# Patient Record
Sex: FEMALE | URBAN - METROPOLITAN AREA
[De-identification: names, ages, dates, MRNs, and addresses within clinical notes are randomized per-mention and may not be internally consistent; named-entity substitution may affect disease eponyms.]

---

## 2020-09-03 ENCOUNTER — NEW REFERRAL (OUTPATIENT)
Dept: URBAN - METROPOLITAN AREA CLINIC 87 | Facility: CLINIC | Age: 65
End: 2020-09-03

## 2020-09-03 DIAGNOSIS — H25.093: ICD-10-CM

## 2020-09-03 DIAGNOSIS — H43.821: ICD-10-CM

## 2020-09-03 DIAGNOSIS — H35.3132: ICD-10-CM

## 2020-09-03 DIAGNOSIS — H35.341: ICD-10-CM

## 2020-09-03 PROCEDURE — 92134 CPTRZ OPH DX IMG PST SGM RTA: CPT

## 2020-09-03 PROCEDURE — 99244 OFF/OP CNSLTJ NEW/EST MOD 40: CPT

## 2020-09-03 PROCEDURE — 92202 OPSCPY EXTND ON/MAC DRAW: CPT

## 2020-09-03 ASSESSMENT — VISUAL ACUITY
OD_SC: 20/400
OD_SC: 20/400
OS_SC: 20/25-1
OS_SC: 20/50

## 2020-09-03 ASSESSMENT — TONOMETRY
OD_IOP_MMHG: 15
OS_IOP_MMHG: 13

## 2020-09-21 ENCOUNTER — SURGERY/PROCEDURE (OUTPATIENT)
Age: 65
End: 2020-09-21

## 2020-09-22 ENCOUNTER — 1 DAY POST-OP (OUTPATIENT)
Dept: URBAN - METROPOLITAN AREA CLINIC 87 | Facility: CLINIC | Age: 65
End: 2020-09-22

## 2020-09-22 DIAGNOSIS — Z98.890: ICD-10-CM

## 2020-09-22 PROCEDURE — 99024 POSTOP FOLLOW-UP VISIT: CPT

## 2020-09-22 ASSESSMENT — TONOMETRY
OD_IOP_MMHG: 13
OS_IOP_MMHG: 12

## 2020-09-22 ASSESSMENT — VISUAL ACUITY: OS_SC: 20/25-1

## 2020-09-29 ENCOUNTER — POST-OP CHECK (OUTPATIENT)
Dept: URBAN - METROPOLITAN AREA CLINIC 87 | Facility: CLINIC | Age: 65
End: 2020-09-29

## 2020-09-29 DIAGNOSIS — H35.341: ICD-10-CM

## 2020-09-29 DIAGNOSIS — H35.3132: ICD-10-CM

## 2020-09-29 DIAGNOSIS — H25.093: ICD-10-CM

## 2020-09-29 PROCEDURE — 92202 OPSCPY EXTND ON/MAC DRAW: CPT

## 2020-09-29 PROCEDURE — 99024 POSTOP FOLLOW-UP VISIT: CPT

## 2020-09-29 ASSESSMENT — VISUAL ACUITY: OS_SC: 20/20-2

## 2020-09-29 ASSESSMENT — TONOMETRY
OS_IOP_MMHG: 9
OD_IOP_MMHG: 9

## 2020-10-19 ENCOUNTER — POST-OP CHECK (OUTPATIENT)
Dept: URBAN - METROPOLITAN AREA CLINIC 87 | Facility: CLINIC | Age: 65
End: 2020-10-19

## 2020-10-19 DIAGNOSIS — H35.341: ICD-10-CM

## 2020-10-19 PROCEDURE — 99024 POSTOP FOLLOW-UP VISIT: CPT

## 2020-10-19 PROCEDURE — 92134 CPTRZ OPH DX IMG PST SGM RTA: CPT

## 2020-10-19 ASSESSMENT — TONOMETRY
OD_IOP_MMHG: 17
OS_IOP_MMHG: 14

## 2020-10-19 ASSESSMENT — VISUAL ACUITY
OD_SC: 20/150
OD_PH: 20/80-2
OS_SC: 20/20-2

## 2020-11-30 ENCOUNTER — POST-OP CHECK (OUTPATIENT)
Dept: URBAN - METROPOLITAN AREA CLINIC 87 | Facility: CLINIC | Age: 65
End: 2020-11-30

## 2020-11-30 VITALS — HEIGHT: 55 IN

## 2020-11-30 DIAGNOSIS — H35.341: ICD-10-CM

## 2020-11-30 PROCEDURE — 99024 POSTOP FOLLOW-UP VISIT: CPT

## 2020-11-30 PROCEDURE — 92134 CPTRZ OPH DX IMG PST SGM RTA: CPT

## 2020-11-30 ASSESSMENT — VISUAL ACUITY
OS_SC: 20/25
OD_PH: 20/150+1
OD_SC: 20/200+2

## 2020-11-30 ASSESSMENT — TONOMETRY
OS_IOP_MMHG: 11
OD_IOP_MMHG: 12

## 2021-02-22 ENCOUNTER — FOLLOW UP (OUTPATIENT)
Dept: URBAN - METROPOLITAN AREA CLINIC 87 | Facility: CLINIC | Age: 66
End: 2021-02-22

## 2021-02-22 DIAGNOSIS — H25.093: ICD-10-CM

## 2021-02-22 DIAGNOSIS — H35.341: ICD-10-CM

## 2021-02-22 DIAGNOSIS — E11.9: ICD-10-CM

## 2021-02-22 DIAGNOSIS — H35.3132: ICD-10-CM

## 2021-02-22 PROCEDURE — 92202 OPSCPY EXTND ON/MAC DRAW: CPT

## 2021-02-22 PROCEDURE — 92134 CPTRZ OPH DX IMG PST SGM RTA: CPT

## 2021-02-22 PROCEDURE — 99214 OFFICE O/P EST MOD 30 MIN: CPT

## 2021-02-22 ASSESSMENT — TONOMETRY
OD_IOP_MMHG: 12
OS_IOP_MMHG: 12

## 2021-02-22 ASSESSMENT — VISUAL ACUITY
OD_SC: 20/150-1
OS_SC: 20/30-1

## 2021-07-26 ENCOUNTER — FOLLOW UP (OUTPATIENT)
Dept: URBAN - METROPOLITAN AREA CLINIC 87 | Facility: CLINIC | Age: 66
End: 2021-07-26

## 2021-07-26 DIAGNOSIS — H35.3132: ICD-10-CM

## 2021-07-26 DIAGNOSIS — E11.9: ICD-10-CM

## 2021-07-26 DIAGNOSIS — H35.341: ICD-10-CM

## 2021-07-26 DIAGNOSIS — H25.092: ICD-10-CM

## 2021-07-26 DIAGNOSIS — Z96.1: ICD-10-CM

## 2021-07-26 DIAGNOSIS — H35.373: ICD-10-CM

## 2021-07-26 DIAGNOSIS — H26.491: ICD-10-CM

## 2021-07-26 PROCEDURE — 92202 OPSCPY EXTND ON/MAC DRAW: CPT

## 2021-07-26 PROCEDURE — 92134 CPTRZ OPH DX IMG PST SGM RTA: CPT

## 2021-07-26 PROCEDURE — 99214 OFFICE O/P EST MOD 30 MIN: CPT

## 2021-07-26 ASSESSMENT — TONOMETRY
OD_IOP_MMHG: 8
OS_IOP_MMHG: 9

## 2021-07-26 ASSESSMENT — VISUAL ACUITY
OD_SC: 20/70+2
OS_SC: 20/20-1

## 2022-05-27 ENCOUNTER — FOLLOW UP (OUTPATIENT)
Dept: URBAN - METROPOLITAN AREA CLINIC 87 | Facility: CLINIC | Age: 67
End: 2022-05-27

## 2022-05-27 DIAGNOSIS — H35.3132: ICD-10-CM

## 2022-05-27 DIAGNOSIS — Z96.1: ICD-10-CM

## 2022-05-27 DIAGNOSIS — E11.9: ICD-10-CM

## 2022-05-27 DIAGNOSIS — H35.372: ICD-10-CM

## 2022-05-27 DIAGNOSIS — H25.092: ICD-10-CM

## 2022-05-27 DIAGNOSIS — H26.491: ICD-10-CM

## 2022-05-27 DIAGNOSIS — H35.341: ICD-10-CM

## 2022-05-27 PROCEDURE — 92202 OPSCPY EXTND ON/MAC DRAW: CPT

## 2022-05-27 PROCEDURE — 92134 CPTRZ OPH DX IMG PST SGM RTA: CPT

## 2022-05-27 PROCEDURE — 92014 COMPRE OPH EXAM EST PT 1/>: CPT

## 2022-05-27 ASSESSMENT — VISUAL ACUITY
OD_SC: 20/70+2
OS_SC: 20/30-2

## 2022-05-27 ASSESSMENT — TONOMETRY
OD_IOP_MMHG: 10
OS_IOP_MMHG: 11

## 2024-02-10 ENCOUNTER — HOSPITAL ENCOUNTER (EMERGENCY)
Facility: HOSPITAL | Age: 69
Discharge: HOME | End: 2024-02-10
Attending: EMERGENCY MEDICINE | Admitting: EMERGENCY MEDICINE
Payer: COMMERCIAL

## 2024-02-10 ENCOUNTER — APPOINTMENT (EMERGENCY)
Dept: RADIOLOGY | Facility: HOSPITAL | Age: 69
End: 2024-02-10
Payer: COMMERCIAL

## 2024-02-10 VITALS
HEART RATE: 93 BPM | RESPIRATION RATE: 20 BRPM | HEIGHT: 62 IN | DIASTOLIC BLOOD PRESSURE: 71 MMHG | OXYGEN SATURATION: 97 % | SYSTOLIC BLOOD PRESSURE: 125 MMHG | TEMPERATURE: 98.6 F

## 2024-02-10 DIAGNOSIS — J45.901 ACUTE ASTHMA EXACERBATION: Primary | ICD-10-CM

## 2024-02-10 LAB
ATRIAL RATE: 78 BPM
FLUAV RNA RESP QL NAA+PROBE: NEGATIVE
FLUBV RNA RESP QL NAA+PROBE: NEGATIVE
P AXIS: 14 DEGREES
PR INTERVAL: 150 MS
QRS AXIS: 76 DEGREES
QRSD INTERVAL: 88 MS
QT INTERVAL: 386 MS
QTC INTERVAL: 440 MS
RSV RNA RESP QL NAA+PROBE: NEGATIVE
SARS-COV-2 RNA RESP QL NAA+PROBE: POSITIVE
T WAVE AXIS: 54 DEGREES
VENTRICULAR RATE: 78 BPM

## 2024-02-10 PROCEDURE — 99285 EMERGENCY DEPT VISIT HI MDM: CPT | Performed by: EMERGENCY MEDICINE

## 2024-02-10 PROCEDURE — 71045 X-RAY EXAM CHEST 1 VIEW: CPT

## 2024-02-10 PROCEDURE — 99283 EMERGENCY DEPT VISIT LOW MDM: CPT

## 2024-02-10 PROCEDURE — 0241U HB NFCT DS VIR RESP RNA 4 TRGT: CPT | Performed by: EMERGENCY MEDICINE

## 2024-02-10 PROCEDURE — 93005 ELECTROCARDIOGRAM TRACING: CPT

## 2024-02-10 PROCEDURE — 94640 AIRWAY INHALATION TREATMENT: CPT

## 2024-02-10 RX ORDER — IPRATROPIUM BROMIDE AND ALBUTEROL SULFATE 2.5; .5 MG/3ML; MG/3ML
3 SOLUTION RESPIRATORY (INHALATION) ONCE
Status: COMPLETED | OUTPATIENT
Start: 2024-02-10 | End: 2024-02-10

## 2024-02-10 RX ORDER — PREDNISONE 50 MG/1
50 TABLET ORAL DAILY
Qty: 4 TABLET | Refills: 0 | Status: SHIPPED | OUTPATIENT
Start: 2024-02-11 | End: 2024-02-15

## 2024-02-10 RX ORDER — DEXTROMETHORPHAN POLISTIREX 30 MG/5ML
60 SUSPENSION ORAL EVERY 12 HOURS PRN
Qty: 89 ML | Refills: 0 | Status: SHIPPED | OUTPATIENT
Start: 2024-02-10 | End: 2024-02-17

## 2024-02-10 RX ORDER — PREDNISONE 20 MG/1
60 TABLET ORAL ONCE
Status: COMPLETED | OUTPATIENT
Start: 2024-02-10 | End: 2024-02-10

## 2024-02-10 RX ORDER — ALBUTEROL SULFATE 2.5 MG/3ML
2.5 SOLUTION RESPIRATORY (INHALATION) EVERY 6 HOURS PRN
Qty: 75 ML | Refills: 0 | Status: SHIPPED | OUTPATIENT
Start: 2024-02-10

## 2024-02-10 RX ORDER — ALBUTEROL SULFATE 90 UG/1
2 AEROSOL, METERED RESPIRATORY (INHALATION) EVERY 4 HOURS PRN
Qty: 18 G | Refills: 0 | Status: SHIPPED | OUTPATIENT
Start: 2024-02-10 | End: 2024-03-11

## 2024-02-10 RX ORDER — BENZONATATE 100 MG/1
100 CAPSULE ORAL ONCE
Status: COMPLETED | OUTPATIENT
Start: 2024-02-10 | End: 2024-02-10

## 2024-02-10 RX ADMIN — PREDNISONE 60 MG: 20 TABLET ORAL at 01:41

## 2024-02-10 RX ADMIN — BENZONATATE 100 MG: 100 CAPSULE ORAL at 01:40

## 2024-02-10 RX ADMIN — IPRATROPIUM BROMIDE AND ALBUTEROL SULFATE 3 ML: .5; 3 SOLUTION RESPIRATORY (INHALATION) at 01:44

## 2024-02-10 RX ADMIN — IPRATROPIUM BROMIDE AND ALBUTEROL SULFATE 3 ML: 2.5; .5 SOLUTION RESPIRATORY (INHALATION) at 01:44

## 2024-02-10 NOTE — ED PROVIDER NOTES
History  Chief Complaint   Patient presents with    Asthma     Pt presents to the ED c/o asthma exacerbation and coughing since Wednesday      Patient is a 68-year-old female seen in the emergency department with concern for shortness of breath/wheezing/cough worsening since Wednesday of this week.  Patient states that she recently attended a family member's , but notes no definite clear known sick contacts with similar symptoms.  Patient states that she felt warm this evening, and took ibuprofen.  Patient notes minimal relief with albuterol inhaler or nebulizer treatments at home.  Patient notes no chest pain, abdominal pain, nausea.  Patient notes some productive cough and posttussive emesis.  Patient states that she has history of asthma.  Patient notes no other definite clear exacerbating or alleviating factors for her symptoms.        None       Past Medical History:   Diagnosis Date    Asthma     Hypertension        Past Surgical History:   Procedure Laterality Date    CHOLECYSTECTOMY      HYSTERECTOMY         History reviewed. No pertinent family history.  I have reviewed and agree with the history as documented.    E-Cigarette/Vaping     E-Cigarette/Vaping Substances     Social History     Tobacco Use    Smoking status: Never    Smokeless tobacco: Never   Substance Use Topics    Alcohol use: Never    Drug use: Never       Review of Systems   Constitutional:  Negative for diaphoresis and unexpected weight change.   HENT:  Negative for ear pain and sore throat.    Eyes:  Negative for pain and visual disturbance.   Respiratory:  Positive for cough, shortness of breath and wheezing.    Cardiovascular:  Negative for chest pain and palpitations.   Gastrointestinal:  Negative for abdominal pain and vomiting.   Genitourinary:  Negative for decreased urine volume and difficulty urinating.   Musculoskeletal:  Negative for arthralgias and back pain.   Skin:  Negative for color change and rash.   Neurological:   Negative for seizures and syncope.   Psychiatric/Behavioral:  Negative for agitation and confusion.    All other systems reviewed and are negative.      Physical Exam  Physical Exam  Vitals and nursing note reviewed.   Constitutional:       General: She is not in acute distress.     Appearance: She is well-developed.   HENT:      Head: Normocephalic and atraumatic.      Right Ear: External ear normal.      Left Ear: External ear normal.      Nose: Nose normal.      Mouth/Throat:      Pharynx: Oropharynx is clear.   Eyes:      General: No scleral icterus.     Conjunctiva/sclera: Conjunctivae normal.   Cardiovascular:      Rate and Rhythm: Normal rate and regular rhythm.      Heart sounds: No murmur heard.  Pulmonary:      Effort: Pulmonary effort is normal. No respiratory distress.      Breath sounds: Wheezing present.      Comments: Scattered expiratory wheezing  Abdominal:      General: There is no distension.      Palpations: Abdomen is soft.      Tenderness: There is no abdominal tenderness.   Musculoskeletal:         General: No deformity or signs of injury.      Cervical back: Normal range of motion and neck supple.   Skin:     General: Skin is warm and dry.   Neurological:      General: No focal deficit present.      Mental Status: She is alert.      Cranial Nerves: No cranial nerve deficit.      Sensory: No sensory deficit.   Psychiatric:         Mood and Affect: Mood normal.         Thought Content: Thought content normal.         Vital Signs  ED Triage Vitals   Temperature Pulse Respirations Blood Pressure SpO2   02/10/24 0122 02/10/24 0119 02/10/24 0119 02/10/24 0119 02/10/24 0119   98.9 °F (37.2 °C) 89 22 169/90 95 %      Temp Source Heart Rate Source Patient Position - Orthostatic VS BP Location FiO2 (%)   02/10/24 0122 02/10/24 0119 02/10/24 0119 02/10/24 0119 --   Oral Monitor Sitting Left arm       Pain Score       02/10/24 0119       No Pain           Vitals:    02/10/24 0119   BP: 169/90   Pulse: 89    Patient Position - Orthostatic VS: Sitting         Visual Acuity      ED Medications  Medications   ipratropium-albuterol (DUO-NEB) 0.5-2.5 mg/3 mL inhalation solution 3 mL (3 mL Nebulization Given 2/10/24 0144)   ipratropium-albuterol (DUO-NEB) 0.5-2.5 mg/3 mL inhalation solution 3 mL (3 mL Nebulization Given 2/10/24 0144)   predniSONE tablet 60 mg (60 mg Oral Given 2/10/24 0141)   benzonatate (TESSALON PERLES) capsule 100 mg (100 mg Oral Given 2/10/24 0140)       Diagnostic Studies  Results Reviewed       Procedure Component Value Units Date/Time    COVID19, Influenza A/B, RSV PCR, [facility] [[id number]] Collected: 02/10/24 0142    Lab Status: In process Specimen: Nares from Nose Updated: 02/10/24 0209                   XR chest 1 view portable   ED Interpretation by Jarad Catalan MD (02/10 0200)   No infiltrate or pneumothorax                 Procedures  ECG 12 Lead Documentation Only    Date/Time: 2/10/2024 1:18 AM    Performed by: Jarad Catalan MD  Authorized by: Jarad Catalan MD    Indications / Diagnosis:  Dyspnea  ECG reviewed by me, the ED Provider: yes    Patient location:  ED  Rate:     ECG rate:  78    ECG rate assessment: normal    Rhythm:     Rhythm: sinus rhythm    QRS:     QRS axis:  Normal  ST segments:     ST segments:  Normal  T waves:     T waves: normal    Comments:      Normal sinus rhythm at 78, normal axis, , QRS 88, QTc 440, no ST-T wave abnormality, no evidence of acute ischemia           ED Course                                             Medical Decision Making  Patient is a 68-year-old female seen in the emergency department with concern for shortness of breath, cough, wheezing.  EKG was obtained and noted.  Chest x-ray showed no infiltrate or pneumothorax.  COVID-19/influenza/RSV swab was ordered in the emergency department, and these tests are pending.  Patient was treated with medication for symptom control, with good effect.  On reevaluation, lungs have improved  aeration bilaterally.  Evaluation is not consistent with ACS, PE, pneumothorax, or pneumonia.  Evaluation is consistent with asthma exacerbation.  Plan to treat patient with course of albuterol and oral steroids, and have patient follow up with PCP/outpatient providers.  Patient stable for discharge home.  Discharge instructions were reviewed with patient.    Problems Addressed:  Acute asthma exacerbation: acute illness or injury    Amount and/or Complexity of Data Reviewed  Labs: ordered. Decision-making details documented in ED Course.  Radiology: ordered and independent interpretation performed. Decision-making details documented in ED Course.  ECG/medicine tests: ordered and independent interpretation performed. Decision-making details documented in ED Course.    Risk  OTC drugs.  Prescription drug management.             Disposition  Final diagnoses:   Acute asthma exacerbation     Time reflects when diagnosis was documented in both MDM as applicable and the Disposition within this note       Time User Action Codes Description Comment    2/10/2024  1:15 AM Jarad Catalan [J45.901] Acute asthma exacerbation           ED Disposition       ED Disposition   Discharge    Condition   Stable    Date/Time   Sat Feb 10, 2024  2:26 AM    Comment   Halley Gan discharge to home/self care.                   Follow-up Information       Follow up With Specialties Details Why Contact Info Additional Information    Your primary doctor  Call in 1 day       [facility] Family Medicine Call  As needed [address] [facility] [phone] [facility], [address] [place] [address], [phone]   [phone]    [facility] Pul[facility]ology Call  As needed [address]  [address]  [facility] [phone] [phone] [facility] [place], [address], [address], [place], [place], [address]    [phone]            Patient's Medications   Discharge Prescriptions    ALBUTEROL (2.5 MG/3 ML) 0.083 % NEBULIZER SOLUTION    Take 3 mL (2.5 mg total) by nebulization every 6 (six) hours as needed for wheezing or shortness of breath       Start Date: 2/10/2024 End Date: --       Order Dose: 2.5 mg       Quantity: 75 mL    Refills: 0    ALBUTEROL (PROAIR HFA) 90 MCG/ACT INHALER    Inhale 2 puffs every 4 (four) hours as needed for wheezing or shortness of breath Dispense with spacer.  Use with spacer.       Start Date: 2/10/2024 End Date: 3/11/2024       Order Dose: 2 puffs       Quantity: 18 g    Refills: 0    DEXTROMETHORPHAN POLISTIREX ER (DELSYM) 30 MG/5ML SUER    Take 10 mL (60 mg total) by mouth every 12 (twelve) hours as needed (cough) for up to 7 days       Start Date: 2/10/2024 End Date: 2/17/2024       Order Dose: 60 mg       Quantity: 89 mL    Refills: 0    PREDNISONE 50 MG TABLET    Take 1 tablet (50 mg total) by mouth daily for 4 days Do not start before February 11, 2024.       Start Date: 2/11/2024 End Date: 2/15/2024       Order Dose: 50 mg       Quantity: 4 tablet    Refills: 0           PDMP Review       None            ED Provider  Electronically Signed by             Jarad Catalan MD  02/10/24 8953

## 2024-02-13 LAB
ATRIAL RATE: 78 BPM
P AXIS: 14 DEGREES
PR INTERVAL: 150 MS
QRS AXIS: 76 DEGREES
QRSD INTERVAL: 88 MS
QT INTERVAL: 386 MS
QTC INTERVAL: 440 MS
T WAVE AXIS: 54 DEGREES
VENTRICULAR RATE: 78 BPM

## (undated) RX ORDER — POLYMYXIN B SULFATE AND TRIMETHOPRIM 1; 10000 MG/ML; [USP'U]/ML: 1 SOLUTION OPHTHALMIC

## (undated) RX ORDER — PREDNISOLONE ACETATE 10 MG/ML: 1 SUSPENSION/ DROPS OPHTHALMIC

## (undated) RX ORDER — CYCLOPENTOLATE HYDROCHLORIDE 10 MG/ML: 1 SOLUTION OPHTHALMIC